# Patient Record
Sex: FEMALE | Race: WHITE
[De-identification: names, ages, dates, MRNs, and addresses within clinical notes are randomized per-mention and may not be internally consistent; named-entity substitution may affect disease eponyms.]

---

## 2018-01-14 ENCOUNTER — HOSPITAL ENCOUNTER (EMERGENCY)
Dept: HOSPITAL 46 - ED | Age: 78
Discharge: HOME | End: 2018-01-14
Payer: MEDICARE

## 2018-01-14 VITALS — HEIGHT: 70 IN | BODY MASS INDEX: 16.46 KG/M2 | WEIGHT: 114.99 LBS

## 2018-01-14 DIAGNOSIS — Z96.642: ICD-10-CM

## 2018-01-14 DIAGNOSIS — Z88.8: ICD-10-CM

## 2018-01-14 DIAGNOSIS — Z87.891: ICD-10-CM

## 2018-01-14 DIAGNOSIS — Z95.1: ICD-10-CM

## 2018-01-14 DIAGNOSIS — I25.2: ICD-10-CM

## 2018-01-14 DIAGNOSIS — F03.90: ICD-10-CM

## 2018-01-14 DIAGNOSIS — Z79.899: ICD-10-CM

## 2018-01-14 DIAGNOSIS — Z88.6: ICD-10-CM

## 2018-01-14 DIAGNOSIS — N39.0: Primary | ICD-10-CM

## 2018-02-14 ENCOUNTER — HOSPITAL ENCOUNTER (EMERGENCY)
Dept: HOSPITAL 46 - ED | Age: 78
Discharge: HOME | End: 2018-02-14
Payer: MEDICARE

## 2018-02-14 VITALS — HEIGHT: 70 IN | BODY MASS INDEX: 16.46 KG/M2 | WEIGHT: 114.99 LBS

## 2018-02-14 DIAGNOSIS — E78.00: ICD-10-CM

## 2018-02-14 DIAGNOSIS — Z88.8: ICD-10-CM

## 2018-02-14 DIAGNOSIS — Z87.891: ICD-10-CM

## 2018-02-14 DIAGNOSIS — I25.2: ICD-10-CM

## 2018-02-14 DIAGNOSIS — Z79.2: ICD-10-CM

## 2018-02-14 DIAGNOSIS — J06.9: Primary | ICD-10-CM

## 2018-02-14 DIAGNOSIS — Z79.899: ICD-10-CM

## 2018-02-14 DIAGNOSIS — I10: ICD-10-CM

## 2018-02-14 DIAGNOSIS — F03.90: ICD-10-CM

## 2018-02-14 DIAGNOSIS — Z88.6: ICD-10-CM

## 2018-02-14 PROCEDURE — 0T9B70Z DRAINAGE OF BLADDER WITH DRAINAGE DEVICE, VIA NATURAL OR ARTIFICIAL OPENING: ICD-10-PCS

## 2018-02-14 NOTE — EKG
[facility]
                                    [address]
                                  [place]  [address]
_________________________________________________________________________________________
                                                                 Signed   
 
 
Atrial fibrillation
Cannot rule out Inferior infarct (cited on or before 14-MAY-2017)
ST \T\ T wave abnormality, consider lateral ischemia Abnormal ECG When compared with ECG
of 14-MAY-2017 10:21,
Atrial fibrillation has replaced Sinus rhythm
ST now depressed in Anterior leads
Inverted T waves have replaced nonspecific T wave abnormality in Inferior leads
Confirmed by CATALINO MITCHELL MD ([id number]) on 2/14/2018 3:01:16 PM
 
 
 
 
 
 
 
 
 
 
 
 
 
 
 
 
 
 
 
 
 
 
 
 
 
 
 
 
 
 
 
 
 
 
 
 
 
    Electronically Signed By: CATALINO MITCHELL MD  02/14/18 1501
_________________________________________________________________________________________
PATIENT NAME:     GABRIEL MILLER                   
MEDICAL RECORD #: [id number]                     Electrocardiogram             
          ACCT #: [id number]  
DATE OF BIRTH:   [birth year]                                       
PHYSICIAN:   CATALINO MITCHELL MD                   REPORT #: [id number]
REPORT IS CONFIDENTIAL AND NOT TO BE RELEASED WITHOUT AUTHORIZATION

## 2018-02-14 NOTE — NUR
PT'S SON IN LAW [place]ARYL REQUESTED I VISIT PT AND HIS WIFE. I STOPPED BY, PT
LAYING STILL, SEEMINGLY UNRESPONSIVE.  HAD NOT YET BEEN IN TO EXAMINE. I
CONNECTED WITH RN ARLIN, HE INFORMED ME THAT LABS, EKG AND CHEST X-RAY HAD BEEN
ORDERED. I SHARED THIS WITH FAMILY-THIS APPEARED TO HELP THEM SOME. VERY
STRESSFUL TIME FOR FAMILY-OTHER FAMILY IN THE HOSP. AT THIS SAME TIME. THEY
FEEL PULLED IN MULTIPLE DIRECTIONS. PRETTY (DAUGHTER) DECLINED PRAYER. WILL
CONTINUE TO FOLLOW